# Patient Record
Sex: FEMALE | Race: ASIAN | NOT HISPANIC OR LATINO | Employment: FULL TIME | URBAN - METROPOLITAN AREA
[De-identification: names, ages, dates, MRNs, and addresses within clinical notes are randomized per-mention and may not be internally consistent; named-entity substitution may affect disease eponyms.]

---

## 2017-10-27 ENCOUNTER — TRANSCRIBE ORDERS (OUTPATIENT)
Dept: LAB | Facility: CLINIC | Age: 27
End: 2017-10-27

## 2017-10-27 ENCOUNTER — APPOINTMENT (OUTPATIENT)
Dept: LAB | Facility: CLINIC | Age: 27
End: 2017-10-27
Payer: COMMERCIAL

## 2017-10-27 DIAGNOSIS — J30.5: Primary | ICD-10-CM

## 2017-10-27 PROCEDURE — 36415 COLL VENOUS BLD VENIPUNCTURE: CPT

## 2017-10-27 PROCEDURE — 86003 ALLG SPEC IGE CRUDE XTRC EA: CPT

## 2017-10-27 PROCEDURE — 82785 ASSAY OF IGE: CPT

## 2017-10-30 LAB
A ALTERNATA IGE QN: <0.1 KUA/I
A FUMIGATUS IGE QN: <0.1 KUA/I
ALLERGEN COMMENT: ABNORMAL
ALLERGEN COMMENT: ABNORMAL
ALMOND IGE QN: <0.1 KUA/I
BERMUDA GRASS IGE QN: <0.1 KUA/I
BOXELDER IGE QN: <0.1 KUA/I
C HERBARUM IGE QN: <0.1 KUA/I
CASHEW NUT IGE QN: <0.1 KUA/I
CAT DANDER IGE QN: <0.1 KUA/I
CMN PIGWEED IGE QN: <0.1 KUA/I
CODFISH IGE QN: <0.1 KUA/I
COMMON RAGWEED IGE QN: <0.1 KUA/I
COTTONWOOD IGE QN: <0.1 KUA/I
D FARINAE IGE QN: <0.1 KUA/I
D PTERONYSS IGE QN: <0.1 KUA/I
DOG DANDER IGE QN: <0.1 KUA/I
EGG WHITE IGE QN: <0.1 KUA/I
GLUTEN IGE QN: 0.21 KUA/I
HAZELNUT IGE QN: <0.1 KUA/L
LONDON PLANE IGE QN: <0.1 KUA/I
MILK IGE QN: <0.1 KUA/I
MOUSE URINE PROT IGE QN: <0.1 KUA/I
MT JUNIPER IGE QN: <0.1 KUA/I
MUGWORT IGE QN: <0.1 KUA/I
P NOTATUM IGE QN: <0.1 KUA/I
PEANUT IGE QN: <0.1 KUA/I
ROACH IGE QN: 0.1 KUA/I
SALMON IGE QN: <0.1 KUA/I
SCALLOP IGE QN: <0.1 KUA/L
SESAME SEED IGE QN: <0.1 KUA/I
SHEEP SORREL IGE QN: <0.1 KUA/I
SHRIMP IGE QN: <0.1 KUA/L
SILVER BIRCH IGE QN: <0.1 KUA/I
SOYBEAN IGE QN: <0.1 KUA/I
TIMOTHY IGE QN: <0.1 KUA/I
TOTAL IGE SMQN RAST: 435 KU/L (ref 0–113)
TOTAL IGE SMQN RAST: 441 KU/L (ref 0–113)
TUNA IGE QN: <0.1 KUA/I
WALNUT IGE QN: <0.1 KUA/I
WALNUT IGE QN: <0.1 KUA/I
WHEAT IGE QN: <0.1 KUA/I
WHITE ASH IGE QN: <0.1 KUA/I
WHITE ELM IGE QN: <0.1 KUA/I
WHITE MULBERRY IGE QN: <0.1 KUA/I
WHITE OAK IGE QN: <0.1 KUA/I

## 2017-11-08 ENCOUNTER — TRANSCRIBE ORDERS (OUTPATIENT)
Dept: ADMINISTRATIVE | Facility: HOSPITAL | Age: 27
End: 2017-11-08

## 2017-11-08 DIAGNOSIS — R92.8 MAMMOGRAM ABNORMAL: Primary | ICD-10-CM

## 2017-11-15 ENCOUNTER — HOSPITAL ENCOUNTER (OUTPATIENT)
Dept: ULTRASOUND IMAGING | Facility: CLINIC | Age: 27
Discharge: HOME | End: 2017-11-15
Payer: COMMERCIAL

## 2017-11-15 DIAGNOSIS — R92.8 MAMMOGRAM ABNORMAL: ICD-10-CM

## 2017-11-15 DIAGNOSIS — N63.0 BREAST LUMP: ICD-10-CM

## 2017-11-15 PROCEDURE — 76642 ULTRASOUND BREAST LIMITED: CPT

## 2017-11-22 ENCOUNTER — ALLSCRIPTS OFFICE VISIT (OUTPATIENT)
Dept: OTHER | Facility: OTHER | Age: 27
End: 2017-11-22

## 2018-01-12 NOTE — MISCELLANEOUS
Provider Comments  Provider Comments:   PATIENT NO SHOWED TODAYS APPT      Signatures   Electronically signed by : Wiley Galvez, ; Nov 22 2017  3:00PM EST                       (Author)

## 2019-09-20 PROBLEM — J34.2 NASAL SEPTAL DEVIATION: Status: ACTIVE | Noted: 2019-09-20

## 2019-09-20 PROBLEM — J34.3 NASAL TURBINATE HYPERTROPHY: Status: ACTIVE | Noted: 2019-09-20

## 2019-09-20 PROBLEM — J34.89 NASAL OBSTRUCTION: Status: ACTIVE | Noted: 2019-09-20

## 2019-09-26 ENCOUNTER — APPOINTMENT (OUTPATIENT)
Dept: LAB | Facility: CLINIC | Age: 29
End: 2019-09-26
Payer: COMMERCIAL

## 2019-09-26 DIAGNOSIS — J34.89 NASAL OBSTRUCTION: ICD-10-CM

## 2019-09-26 DIAGNOSIS — J34.3 NASAL TURBINATE HYPERTROPHY: ICD-10-CM

## 2019-09-26 DIAGNOSIS — J34.2 NASAL SEPTAL DEVIATION: ICD-10-CM

## 2019-09-26 LAB
ANION GAP SERPL CALCULATED.3IONS-SCNC: 8 MMOL/L (ref 4–13)
BASOPHILS # BLD AUTO: 0.04 THOUSANDS/ΜL (ref 0–0.1)
BASOPHILS NFR BLD AUTO: 1 % (ref 0–1)
BUN SERPL-MCNC: 11 MG/DL (ref 5–25)
CALCIUM SERPL-MCNC: 9.1 MG/DL (ref 8.3–10.1)
CHLORIDE SERPL-SCNC: 105 MMOL/L (ref 100–108)
CO2 SERPL-SCNC: 29 MMOL/L (ref 21–32)
CREAT SERPL-MCNC: 0.72 MG/DL (ref 0.6–1.3)
EOSINOPHIL # BLD AUTO: 0.1 THOUSAND/ΜL (ref 0–0.61)
EOSINOPHIL NFR BLD AUTO: 2 % (ref 0–6)
ERYTHROCYTE [DISTWIDTH] IN BLOOD BY AUTOMATED COUNT: 13.9 % (ref 11.6–15.1)
GFR SERPL CREATININE-BSD FRML MDRD: 114 ML/MIN/1.73SQ M
GLUCOSE SERPL-MCNC: 74 MG/DL (ref 65–140)
HCT VFR BLD AUTO: 40.6 % (ref 34.8–46.1)
HGB BLD-MCNC: 13.1 G/DL (ref 11.5–15.4)
IMM GRANULOCYTES # BLD AUTO: 0 THOUSAND/UL (ref 0–0.2)
IMM GRANULOCYTES NFR BLD AUTO: 0 % (ref 0–2)
LYMPHOCYTES # BLD AUTO: 1.56 THOUSANDS/ΜL (ref 0.6–4.47)
LYMPHOCYTES NFR BLD AUTO: 38 % (ref 14–44)
MCH RBC QN AUTO: 30 PG (ref 26.8–34.3)
MCHC RBC AUTO-ENTMCNC: 32.3 G/DL (ref 31.4–37.4)
MCV RBC AUTO: 93 FL (ref 82–98)
MONOCYTES # BLD AUTO: 0.4 THOUSAND/ΜL (ref 0.17–1.22)
MONOCYTES NFR BLD AUTO: 10 % (ref 4–12)
NEUTROPHILS # BLD AUTO: 2.04 THOUSANDS/ΜL (ref 1.85–7.62)
NEUTS SEG NFR BLD AUTO: 49 % (ref 43–75)
NRBC BLD AUTO-RTO: 0 /100 WBCS
PLATELET # BLD AUTO: 245 THOUSANDS/UL (ref 149–390)
PMV BLD AUTO: 10.8 FL (ref 8.9–12.7)
POTASSIUM SERPL-SCNC: 3.8 MMOL/L (ref 3.5–5.3)
RBC # BLD AUTO: 4.37 MILLION/UL (ref 3.81–5.12)
SODIUM SERPL-SCNC: 142 MMOL/L (ref 136–145)
WBC # BLD AUTO: 4.14 THOUSAND/UL (ref 4.31–10.16)

## 2019-09-26 PROCEDURE — 36415 COLL VENOUS BLD VENIPUNCTURE: CPT

## 2019-09-26 PROCEDURE — 80048 BASIC METABOLIC PNL TOTAL CA: CPT

## 2019-09-26 PROCEDURE — 85025 COMPLETE CBC W/AUTO DIFF WBC: CPT

## 2019-09-26 NOTE — PRE-PROCEDURE INSTRUCTIONS
No outpatient medications have been marked as taking for the 10/3/19 encounter [facility] Encounter)  Pre op and bathing instructions reviewed

## 2019-09-27 PROBLEM — M95.0 ACQUIRED NASAL DEFORMITY: Status: ACTIVE | Noted: 2019-09-27

## 2019-09-27 PROBLEM — T17.1XXA FOREIGN BODY IN NOSE: Status: ACTIVE | Noted: 2019-09-27

## 2019-09-27 NOTE — H&P (VIEW-ONLY)
Debby Munson is a 30 y  o female who presents for re-evaluation of nasal obstruction  She had a previous rhinoplasty with an implant and is concerned that the implant may be getting infected as well  Her mother had a similar problem  She has considered whether she wants a cadaveric verses an autologous graft  At this time she is feeling that she would like to have a cadaveric graft due to pain concerns  Past Medical History:   Diagnosis Date    Gluten intolerance     PONV (postoperative nausea and vomiting)        There were no vitals taken for this visit  Physical Exam   Constitutional: Oriented to person, place, and time  Well-developed and well-nourished, no apparent distress, non-toxic appearance  Cooperative, able to hear and answer questions without difficulty  Voice: Normal voice quality  Head: Normocephalic, atraumatic  No scars, masses or lesions  Face: Symmetric, no edema, no sinus tenderness  Eyes: Vision grossly intact, extra-ocular movement intact  Ears: External ear normal   Bilateral tympanic membranes are intact with intact normal landmarks  No post-auricular erythema or tenderness  Nose:    Mucosa: Edematous   Turbinates: Hypertrophic bilaterally   Septum: Left septal deviation   Internal valves:  bilateral  nasal valve obstruction +[place] maneuver    External valves:   Zone 1  Zone 2      Right:  0  0      Left:  0  0   Nasal tip: Good tip support without droop  There is obvious graft material in the nasal tip which is mildly asymmetric and the implant is palpable to the tip of the nose  External contour: Mild C shaped deformity at the junction of the lower and middle third  Palpable implant across the entire dorsum with obvious extension  Nasal bones: No palpable osteotomies  Oral cavity: Dentition intact  Mucosa moist, lips normal   Tongue mobile, floor of mouth normal   Hard palate unremarkable  No masses or lesions     Oropharynx: Uvula is midline, soft palate normal   Unremarkable oropharyngeal inlet  Tonsils unremarkable  Posterior pharyngeal wall clear  No masses or lesions  Salivary glands:  Parotid glands and submandibular glands symmetric, no enlargement or tenderness  Neck: Normal laryngeal elevation with swallow  Trachea midline  No masses or lesions  No palpable adenopathy  Thyroid: normal in size, unremarkable without tenderness or palpable nodules  Pulmonary/Chest: Normal effort and rate  No respiratory distress  Clear to ausculation bilaterally  Musculoskeletal: Normal range of motion  Neurological: Cranial nerves 2-12 intact  Skin: Skin is warm and dry  Psychiatric: Normal mood and affect  CV: RRR    No palpable rib fractures or cartilage abnormalities  Pt brought my attention to an asymmetry of her breast implants and I will refer her to a breast surgeon  A/P: Nasal obstruction: Risks and benefits were again reviewed with the patient, including but not limited to bleeding, infection, external scars, internal scars, breathing obstruction, septal perforation, external deformity, numbness or stiffness of the nasal tip, asymmetry, need for revision, numbness of the upper teeth, among others  Questions were answered  History and physical and consent were obtained today in clinic  Plan is for:    Repair of nasal vestibular stenosis, removal of nasal foreign body, cadaveric rib cartilage graft, L strut septoplasty, submucous resection of turbinates, and reconstructive rhinoplasty after removal of the graft  Will plan for diced cartilage graft to improve dorsal contour and symmetry  Possible columellar extension graft to preserve length and dorsal graft to preserve form

## 2019-10-02 ENCOUNTER — ANESTHESIA EVENT (OUTPATIENT)
Dept: PERIOP | Facility: HOSPITAL | Age: 29
End: 2019-10-02
Payer: COMMERCIAL

## 2019-10-03 ENCOUNTER — ANESTHESIA (OUTPATIENT)
Dept: PERIOP | Facility: HOSPITAL | Age: 29
End: 2019-10-03
Payer: COMMERCIAL

## 2019-10-03 ENCOUNTER — HOSPITAL ENCOUNTER (OUTPATIENT)
Facility: HOSPITAL | Age: 29
Setting detail: OUTPATIENT SURGERY
Discharge: HOME | End: 2019-10-03
Attending: OTOLARYNGOLOGY | Admitting: OTOLARYNGOLOGY
Payer: COMMERCIAL

## 2019-10-03 VITALS
HEART RATE: 61 BPM | OXYGEN SATURATION: 97 % | SYSTOLIC BLOOD PRESSURE: 119 MMHG | TEMPERATURE: 98.3 F | RESPIRATION RATE: 19 BRPM | BODY MASS INDEX: 18.61 KG/M2 | DIASTOLIC BLOOD PRESSURE: 74 MMHG | HEIGHT: 63 IN | WEIGHT: 105 LBS

## 2019-10-03 DIAGNOSIS — J34.2 NASAL SEPTAL DEVIATION: ICD-10-CM

## 2019-10-03 DIAGNOSIS — T17.1XXS FOREIGN BODY IN NOSE, SEQUELA: ICD-10-CM

## 2019-10-03 DIAGNOSIS — M95.0 ACQUIRED NASAL DEFORMITY: ICD-10-CM

## 2019-10-03 DIAGNOSIS — J34.89 NASAL OBSTRUCTION: Primary | ICD-10-CM

## 2019-10-03 DIAGNOSIS — J34.3 NASAL TURBINATE HYPERTROPHY: ICD-10-CM

## 2019-10-03 LAB
EXT PREGNANCY TEST URINE: NEGATIVE
EXT. CONTROL: NORMAL

## 2019-10-03 PROCEDURE — 88304 TISSUE EXAM BY PATHOLOGIST: CPT | Performed by: PATHOLOGY

## 2019-10-03 PROCEDURE — 30140 RESECT INFERIOR TURBINATE: CPT | Performed by: OTOLARYNGOLOGY

## 2019-10-03 PROCEDURE — 30465 REPAIR NASAL STENOSIS: CPT | Performed by: OTOLARYNGOLOGY

## 2019-10-03 PROCEDURE — C1762 CONN TISS, HUMAN(INC FASCIA): HCPCS | Performed by: OTOLARYNGOLOGY

## 2019-10-03 PROCEDURE — 20926 PR REMV TISSUE FOR GRAFT OTHR: CPT | Performed by: OTOLARYNGOLOGY

## 2019-10-03 PROCEDURE — 30310 REMOVE NASAL FOREIGN BODY: CPT | Performed by: OTOLARYNGOLOGY

## 2019-10-03 PROCEDURE — 81025 URINE PREGNANCY TEST: CPT | Performed by: STUDENT IN AN ORGANIZED HEALTH CARE EDUCATION/TRAINING PROGRAM

## 2019-10-03 DEVICE — GRAFT COSTAL CARTILAGE MED 30MM X 3CM  10-30MM THCK: Type: IMPLANTABLE DEVICE | Site: NOSE | Status: FUNCTIONAL

## 2019-10-03 RX ORDER — METOCLOPRAMIDE HYDROCHLORIDE 5 MG/ML
10 INJECTION INTRAMUSCULAR; INTRAVENOUS ONCE AS NEEDED
Status: DISCONTINUED | OUTPATIENT
Start: 2019-10-03 | End: 2019-10-03 | Stop reason: HOSPADM

## 2019-10-03 RX ORDER — LIDOCAINE HYDROCHLORIDE AND EPINEPHRINE 10; 10 MG/ML; UG/ML
INJECTION, SOLUTION INFILTRATION; PERINEURAL AS NEEDED
Status: DISCONTINUED | OUTPATIENT
Start: 2019-10-03 | End: 2019-10-03 | Stop reason: HOSPADM

## 2019-10-03 RX ORDER — HYDROMORPHONE HCL/PF 1 MG/ML
SYRINGE (ML) INJECTION AS NEEDED
Status: DISCONTINUED | OUTPATIENT
Start: 2019-10-03 | End: 2019-10-03 | Stop reason: SURG

## 2019-10-03 RX ORDER — ACETAMINOPHEN 325 MG/1
650 TABLET ORAL EVERY 6 HOURS PRN
Status: DISCONTINUED | OUTPATIENT
Start: 2019-10-03 | End: 2019-10-03 | Stop reason: HOSPADM

## 2019-10-03 RX ORDER — GLYCOPYRROLATE 0.2 MG/ML
INJECTION INTRAMUSCULAR; INTRAVENOUS AS NEEDED
Status: DISCONTINUED | OUTPATIENT
Start: 2019-10-03 | End: 2019-10-03 | Stop reason: SURG

## 2019-10-03 RX ORDER — FENTANYL CITRATE/PF 50 MCG/ML
50 SYRINGE (ML) INJECTION
Status: DISCONTINUED | OUTPATIENT
Start: 2019-10-03 | End: 2019-10-03 | Stop reason: HOSPADM

## 2019-10-03 RX ORDER — ROCURONIUM BROMIDE 10 MG/ML
INJECTION, SOLUTION INTRAVENOUS AS NEEDED
Status: DISCONTINUED | OUTPATIENT
Start: 2019-10-03 | End: 2019-10-03 | Stop reason: SURG

## 2019-10-03 RX ORDER — SCOLOPAMINE TRANSDERMAL SYSTEM 1 MG/1
1 PATCH, EXTENDED RELEASE TRANSDERMAL
Status: DISCONTINUED | OUTPATIENT
Start: 2019-10-03 | End: 2019-10-03 | Stop reason: HOSPADM

## 2019-10-03 RX ORDER — LIDOCAINE HYDROCHLORIDE 10 MG/ML
INJECTION, SOLUTION EPIDURAL; INFILTRATION; INTRACAUDAL; PERINEURAL AS NEEDED
Status: DISCONTINUED | OUTPATIENT
Start: 2019-10-03 | End: 2019-10-03 | Stop reason: SURG

## 2019-10-03 RX ORDER — FENTANYL CITRATE 50 UG/ML
INJECTION, SOLUTION INTRAMUSCULAR; INTRAVENOUS AS NEEDED
Status: DISCONTINUED | OUTPATIENT
Start: 2019-10-03 | End: 2019-10-03 | Stop reason: SURG

## 2019-10-03 RX ORDER — LIDOCAINE HYDROCHLORIDE 10 MG/ML
0.5 INJECTION, SOLUTION EPIDURAL; INFILTRATION; INTRACAUDAL; PERINEURAL ONCE AS NEEDED
Status: DISCONTINUED | OUTPATIENT
Start: 2019-10-03 | End: 2019-10-03 | Stop reason: HOSPADM

## 2019-10-03 RX ORDER — OXYMETAZOLINE HYDROCHLORIDE 0.05 G/100ML
SPRAY NASAL AS NEEDED
Status: DISCONTINUED | OUTPATIENT
Start: 2019-10-03 | End: 2019-10-03 | Stop reason: HOSPADM

## 2019-10-03 RX ORDER — SODIUM CHLORIDE, SODIUM LACTATE, POTASSIUM CHLORIDE, CALCIUM CHLORIDE 600; 310; 30; 20 MG/100ML; MG/100ML; MG/100ML; MG/100ML
INJECTION, SOLUTION INTRAVENOUS CONTINUOUS PRN
Status: DISCONTINUED | OUTPATIENT
Start: 2019-10-03 | End: 2019-10-03 | Stop reason: SURG

## 2019-10-03 RX ORDER — ONDANSETRON 2 MG/ML
INJECTION INTRAMUSCULAR; INTRAVENOUS AS NEEDED
Status: DISCONTINUED | OUTPATIENT
Start: 2019-10-03 | End: 2019-10-03 | Stop reason: SURG

## 2019-10-03 RX ORDER — DIPHENHYDRAMINE HYDROCHLORIDE 50 MG/ML
12.5 INJECTION INTRAMUSCULAR; INTRAVENOUS ONCE AS NEEDED
Status: DISCONTINUED | OUTPATIENT
Start: 2019-10-03 | End: 2019-10-03 | Stop reason: HOSPADM

## 2019-10-03 RX ORDER — ONDANSETRON 4 MG/1
8 TABLET, ORALLY DISINTEGRATING ORAL ONCE
Status: COMPLETED | OUTPATIENT
Start: 2019-10-03 | End: 2019-10-03

## 2019-10-03 RX ORDER — OXYCODONE HYDROCHLORIDE 5 MG/1
5 TABLET ORAL EVERY 4 HOURS PRN
Qty: 15 TABLET | Refills: 0 | Status: SHIPPED | OUTPATIENT
Start: 2019-10-03 | End: 2019-10-13

## 2019-10-03 RX ORDER — GINSENG 100 MG
CAPSULE ORAL AS NEEDED
Status: DISCONTINUED | OUTPATIENT
Start: 2019-10-03 | End: 2019-10-03 | Stop reason: HOSPADM

## 2019-10-03 RX ORDER — HYDROMORPHONE HCL/PF 1 MG/ML
0.2 SYRINGE (ML) INJECTION
Status: DISCONTINUED | OUTPATIENT
Start: 2019-10-03 | End: 2019-10-03 | Stop reason: HOSPADM

## 2019-10-03 RX ORDER — NEOSTIGMINE METHYLSULFATE 1 MG/ML
INJECTION INTRAVENOUS AS NEEDED
Status: DISCONTINUED | OUTPATIENT
Start: 2019-10-03 | End: 2019-10-03 | Stop reason: SURG

## 2019-10-03 RX ORDER — OXYCODONE HCL 5 MG/5 ML
5 SOLUTION, ORAL ORAL EVERY 4 HOURS PRN
Status: DISCONTINUED | OUTPATIENT
Start: 2019-10-03 | End: 2019-10-03 | Stop reason: HOSPADM

## 2019-10-03 RX ORDER — DEXAMETHASONE SODIUM PHOSPHATE 10 MG/ML
INJECTION, SOLUTION INTRAMUSCULAR; INTRAVENOUS AS NEEDED
Status: DISCONTINUED | OUTPATIENT
Start: 2019-10-03 | End: 2019-10-03 | Stop reason: SURG

## 2019-10-03 RX ORDER — MAGNESIUM HYDROXIDE 1200 MG/15ML
LIQUID ORAL AS NEEDED
Status: DISCONTINUED | OUTPATIENT
Start: 2019-10-03 | End: 2019-10-03 | Stop reason: HOSPADM

## 2019-10-03 RX ORDER — HYDROMORPHONE HCL/PF 1 MG/ML
0.5 SYRINGE (ML) INJECTION
Status: DISCONTINUED | OUTPATIENT
Start: 2019-10-03 | End: 2019-10-03 | Stop reason: HOSPADM

## 2019-10-03 RX ORDER — ONDANSETRON 2 MG/ML
4 INJECTION INTRAMUSCULAR; INTRAVENOUS ONCE AS NEEDED
Status: DISCONTINUED | OUTPATIENT
Start: 2019-10-03 | End: 2019-10-03 | Stop reason: HOSPADM

## 2019-10-03 RX ORDER — MIDAZOLAM HYDROCHLORIDE 1 MG/ML
INJECTION INTRAMUSCULAR; INTRAVENOUS AS NEEDED
Status: DISCONTINUED | OUTPATIENT
Start: 2019-10-03 | End: 2019-10-03 | Stop reason: SURG

## 2019-10-03 RX ORDER — PROPOFOL 10 MG/ML
INJECTION, EMULSION INTRAVENOUS AS NEEDED
Status: DISCONTINUED | OUTPATIENT
Start: 2019-10-03 | End: 2019-10-03 | Stop reason: SURG

## 2019-10-03 RX ORDER — CEPHALEXIN 500 MG/1
500 CAPSULE ORAL 4 TIMES DAILY
Qty: 28 CAPSULE | Refills: 0 | Status: SHIPPED | OUTPATIENT
Start: 2019-10-03 | End: 2019-10-10

## 2019-10-03 RX ORDER — CEFAZOLIN SODIUM 1 G/50ML
SOLUTION INTRAVENOUS AS NEEDED
Status: DISCONTINUED | OUTPATIENT
Start: 2019-10-03 | End: 2019-10-03 | Stop reason: SURG

## 2019-10-03 RX ADMIN — MIDAZOLAM HYDROCHLORIDE 2 MG: 1 INJECTION, SOLUTION INTRAMUSCULAR; INTRAVENOUS at 13:06

## 2019-10-03 RX ADMIN — HYDROMORPHONE HYDROCHLORIDE 0.25 MG: 1 INJECTION, SOLUTION INTRAMUSCULAR; INTRAVENOUS; SUBCUTANEOUS at 15:30

## 2019-10-03 RX ADMIN — SODIUM CHLORIDE, SODIUM LACTATE, POTASSIUM CHLORIDE, AND CALCIUM CHLORIDE: .6; .31; .03; .02 INJECTION, SOLUTION INTRAVENOUS at 16:15

## 2019-10-03 RX ADMIN — LIDOCAINE HYDROCHLORIDE 50 MG: 10 INJECTION, SOLUTION EPIDURAL; INFILTRATION; INTRACAUDAL; PERINEURAL at 13:13

## 2019-10-03 RX ADMIN — HYDROMORPHONE HYDROCHLORIDE 0.25 MG: 1 INJECTION, SOLUTION INTRAMUSCULAR; INTRAVENOUS; SUBCUTANEOUS at 16:16

## 2019-10-03 RX ADMIN — FENTANYL CITRATE 50 MCG: 50 INJECTION INTRAMUSCULAR; INTRAVENOUS at 17:03

## 2019-10-03 RX ADMIN — NEOSTIGMINE METHYLSULFATE 2.5 MG: 1 INJECTION INTRAVENOUS at 16:12

## 2019-10-03 RX ADMIN — FENTANYL CITRATE 50 MCG: 50 INJECTION INTRAMUSCULAR; INTRAVENOUS at 16:57

## 2019-10-03 RX ADMIN — SCOPOLAMINE 1 PATCH: 1 PATCH TRANSDERMAL at 12:55

## 2019-10-03 RX ADMIN — CEFAZOLIN SODIUM 1000 MG: 1 SOLUTION INTRAVENOUS at 13:17

## 2019-10-03 RX ADMIN — ROCURONIUM BROMIDE 50 MG: 10 SOLUTION INTRAVENOUS at 13:13

## 2019-10-03 RX ADMIN — SODIUM CHLORIDE, SODIUM LACTATE, POTASSIUM CHLORIDE, AND CALCIUM CHLORIDE: .6; .31; .03; .02 INJECTION, SOLUTION INTRAVENOUS at 13:09

## 2019-10-03 RX ADMIN — HYDROMORPHONE HYDROCHLORIDE 0.25 MG: 1 INJECTION, SOLUTION INTRAMUSCULAR; INTRAVENOUS; SUBCUTANEOUS at 14:15

## 2019-10-03 RX ADMIN — OXYCODONE HYDROCHLORIDE 5 MG: 5 SOLUTION ORAL at 18:21

## 2019-10-03 RX ADMIN — ONDANSETRON 8 MG: 4 TABLET, ORALLY DISINTEGRATING ORAL at 18:57

## 2019-10-03 RX ADMIN — FENTANYL CITRATE 100 MCG: 50 INJECTION INTRAMUSCULAR; INTRAVENOUS at 13:13

## 2019-10-03 RX ADMIN — PROPOFOL 150 MG: 10 INJECTION, EMULSION INTRAVENOUS at 13:13

## 2019-10-03 RX ADMIN — GLYCOPYRROLATE 0.5 MG: 0.2 INJECTION, SOLUTION INTRAMUSCULAR; INTRAVENOUS at 16:12

## 2019-10-03 RX ADMIN — DEXAMETHASONE SODIUM PHOSPHATE 10 MG: 10 INJECTION, SOLUTION INTRAMUSCULAR; INTRAVENOUS at 13:16

## 2019-10-03 RX ADMIN — ONDANSETRON 4 MG: 2 INJECTION INTRAMUSCULAR; INTRAVENOUS at 16:06

## 2019-10-03 RX ADMIN — HYDROMORPHONE HYDROCHLORIDE 0.25 MG: 1 INJECTION, SOLUTION INTRAMUSCULAR; INTRAVENOUS; SUBCUTANEOUS at 14:26

## 2019-10-03 RX ADMIN — PROPOFOL 20 MG: 10 INJECTION, EMULSION INTRAVENOUS at 16:15

## 2019-10-03 NOTE — ANESTHESIA PREPROCEDURE EVALUATION
Review of Systems/Medical History  Patient summary reviewed  Chart reviewed  History of anesthetic complications PONV    Cardiovascular  Negative cardio ROS    Pulmonary  Negative pulmonary ROS        GI/Hepatic  Negative GI/hepatic ROS          Negative  ROS        Endo/Other    Comment: Nasal obstruction  Septal deviation  Turbinate hypertrophy    GYN  Negative gynecology ROS          Hematology  Negative hematology ROS      Musculoskeletal  Negative musculoskeletal ROS        Neurology  Negative neurology ROS      Psychology   Negative psychology ROS              Physical Exam    Airway    Mallampati score: II  TM Distance: >3 FB  Neck ROM: full     Dental   No notable dental hx     Cardiovascular  Comment: Negative ROS, Rhythm: regular, Rate: normal, Cardiovascular exam normal    Pulmonary  Pulmonary exam normal Breath sounds clear to auscultation,     Other Findings        Anesthesia Plan  ASA Score- 1     Anesthesia Type- general with ASA Monitors  Additional Monitors:   Airway Plan: ETT  Plan Factors-    Induction- intravenous  Postoperative Plan- Plan for postoperative opioid use  Informed Consent- Anesthetic plan and risks discussed with patient  I personally reviewed this patient with the CRNA  Discussed and agreed on the Anesthesia Plan with the CRNA  Marina Cardenas Recent labs personally reviewed:  Lab Results   Component Value Date    WBC 4 14 (L) 09/26/2019    HGB 13 1 09/26/2019     09/26/2019     Lab Results   Component Value Date    K 3 8 09/26/2019    BUN 11 09/26/2019    CREATININE 0 72 09/26/2019     I, Eleanor Roper MD, have personally seen and evaluated the patient prior to anesthetic care  I have reviewed the pre-anesthetic record, and other medical records if appropriate to the anesthetic care  If a CRNA is involved in the case, I have reviewed the CRNA assessment, if present, and agree   Risks/benefits and alternatives discussed with patient including possible PONV, sore throat, and possibility of rare anesthetic and surgical emergencies

## 2019-10-03 NOTE — OP NOTE
OPERATIVE REPORT  PATIENT NAME: Tomasa Banks    :  1990  MRN: [id number]  Pt Location: AN OR ROOM 03    SURGERY DATE: 10/3/2019    Surgeon(s) and Role:     * Aubrey Pimentel MD - Primary    Preop Diagnosis:  Nasal obstruction [J34 89]  Nasal septal deviation [J34 2]  Nasal turbinate hypertrophy [J34 3]    Post-Op Diagnosis Codes:     * Nasal obstruction [J34 89]     * Nasal septal deviation [J34 2]     * Nasal turbinate hypertrophy [J34 3]    Procedure(s) (LRB):  REPAIR VESTIBULAR STENOSIS (N/A)  SUBMUCOSAL RESECTION OF TURBINATES (N/A)  RHINOPLASTY (N/A)  RIB CARTILAGE GRAFT ( FOR RHINOPLASTY) (N/A)    Specimen(s):  ID Type Source Tests Collected by Time Destination   1 : DORSAL NASAL TISSUE Tissue Soft Tissue, Other TISSUE EXAM Aubrey Pimentel MD 10/3/2019 1415          Drains:  * No LDAs found *    Anesthesia Type:   General    Operative Indications:  Nasal obstruction [J34 89]  Nasal septal deviation [J34 2]  Nasal turbinate hypertrophy [J34 3]  Foreign body of the nose sequelae    Operative Findings:  Large dorsal silicone implant with obvious necrotic tissue surrounding  Previous septoplasty performed  Complications:   None    Procedure and Technique:  PREOPERATIVE DIAGNOSES:  1  Nasal valve stenosis with obstruction  2  Septal deviation with nasal obstruction  3  Turbinate hypertrophy  4  Nasal deformity    POSTOPERATIVE DIAGNOSES:  Same    PROCEDURES:  1  Open repair of nasal stenosis  2  Removal of nasal foreign body  3  Rib cartilage graft to nose  4  Bilateral submucous resection of inferior turbinates  5  Rhinoplasty (cosmetic defect caused by the necrotic defect from the previously implanted foreign body)    SURGEON:  Xenia Russ MD    ASSISTANTS: None    ANESTHESIA:  General     ESTIMATED BLOOD LOSS:  < 100 cc    FLUIDS:  Crystalloid    COMPLICATIONS:  None      INDICATIONS FOR PROCEDURE:  This is a 30y o  year old female whom I've seen previously in consultation regarding nasal obstruction and desire to change shape of the nose  Risks and benefits of the above procedures were discussed at length, including but not limited to bleeding, infection, external or internal nasal scars, septal perforation, change in the shape of the nose, undesired aesthetic outcome, nasal obstruction, among others  Patient understands and consents to proceed  BODY OF REPORT:   The patient was brought to the procedure room, placed on the procedure table in supine position  General  anesthesia was induced without complication  The table was rotated  The nose was injected with a mixture of 1% lidocaine with 1:100,000 of epinephrine in the tip, columella, dorsum, alar bases bilaterally, and septum bilaterally  An afrin-soaked pledget was placed in each nostril  The face was prepped and draped in the usual sterile fashion  The eyelids were gently taped sterilely after removing any prep from the lid skin  A standard timeout was taken to confirm patient name, procedures, sidedness, allergies, medications administered among others  Submucous resection of the inferior turbinates was performed bilaterally as follows: The anterior portion of the inferior turbinate was incised using electrocautery  A submucoperiosteal dissection was performed using a Coal elevator  The anterior 1 5-2 cm of bone was removed with a Olga Greer forcep  Mucosa was preserved  The turbinate was outfractured using a Toole elevator  Repair of nasal vestibular stenosis was performed as follows: An inverted gull-wing incision was made at the mid-columellar level using a 6700 blade, in continuity with incisions 1-2 mm below the inferior margin of the lower lateral cartilages intranasally  The nose was decorticated in a sub-SNAS plane using scissors, to the level of the rhinion  At this level, a McKenty elevator was used to dissect subperiosteally to the nasofrontal suture      Removal of nasal foreign body was performed as follows: After the nose was opened a cartilage shield graft was encountered  This was preserved as the intent was not to change the appearance of her nose  Shield graft was left in the exact orientation it was previously aligned in  Immediately posterior and dorsal to the shield graft was a silicone graft and significant tissue necrosis around the implant  The implant was removed using blunt dissection  The necrotic tissue was then removed sharply using Jose G Brian scissors  The area was copiously irrigated with normal saline  The upper lateral cartilages were released from the septum, using a D-knife  Auto  grafts were formed from the ULC by making a small incision along length of the ULC  The medial portion was then folded down between the upper lateral cartilage and the septum and sutured using 5-0 PDS  Care was taken to preserve the native superoinferior positioning of the upper lateral cartilages in relation to the septum  Cadaveric rib cartilage graft was performed as follows: Irradiated rib allograft was thawed and washed in saline per protocol (three washes)  The rib was then carved towards its core concentrically, and let sit in saline to identify any warping  The graft was then diced into very fine pieces using a dermatome blade  Rhinoplasty for defect left by removal of the destructive dorsal implant was performed as follows: The patient was left with a flat in nasal appearance from the previous silicone graft removal   This left a substantial decrease in her dorsal support and dramatic change from her baseline appearance  It was thus determined that the patient required dorsal augmentation to mimic the previous graft in the now healing pocket  After cleaning the wound copiously with saline the diced cartilage was placed into a 3 mL syringe that had previously been split in half  The graft was pressed down to remove any remaining saline   The graft was then covered in [address] and pressed into a mold from the syringe and held to dry for 5 minutes  This was then shaped similiarly to her previous dorsal implant using a 15 blade  She  was checked for contour and tip support  She  was found to have good contour and symmetry from all angles, including lateral, frontal and base view  Tip support was good  The skin flap was replaced and contour verified  The columella was closed using multiple interrupted 6-0 Prolene sutures  Intranasal incisions were carefully sutured using  5-0 chromic gut suture  An external thermoplastic splint was placed after taping the nose  The patient was returned to the care of Anesthesia, extubated without difficulty, and taken to the recovery area in stable condition  All instruments and sponge counts were correct at the end of the procedure  Paulette Shah MD, was present for and performed all key elements of the procedure       I was present for the entire procedure and A qualified resident physician was not available    Patient Disposition:  PACU  and extubated and stable    SIGNATURE: Caryle Pray, MD  DATE: October 3, 2019  TIME: 4:50 PM

## 2019-10-03 NOTE — ANESTHESIA POSTPROCEDURE EVALUATION
Post-Op Assessment Note    CV Status:  Stable    Pain management: adequate     Mental Status:  Alert and awake   Hydration Status:  Stable   PONV Controlled:  None   Airway Patency:  Patent   Post Op Vitals Reviewed: Yes      Staff: CRNA           /61 (10/03/19 1636)    Temp 97 5 °F (36 4 °C) (10/03/19 1636)    Pulse 77 (10/03/19 1636)   Resp 20 (10/03/19 1636)    SpO2 100 % (10/03/19 1636)

## 2019-10-03 NOTE — DISCHARGE INSTRUCTIONS
[facility]  [facility]   Rhinoplasty Post-Operative Care  Office [phone]  Cell [phone]               At Home (in the days immediately following the procedure):     Try to sleep with your head elevated on 2-3 pillows   You may experience moderate bleeding from the nose--this is normal  The gauze dressing under your nose may be changed as necessary   Iced packs placed over the eyes will help reduce swelling  They should be used 20 minutes on/ 20 minutes off while awake for the first full day  Crushed ice in ziplock bags or frozen peas or corn work well   The outside and half inch inside each nostril may be cleaned with a Q-tip soaked in hydrogen peroxide   Apply antibiotic ointment (Bacitracin) or Vaseline to the first half inch inside your nose and the external incision 3-4 times per day   Take your medicines as prescribed  REMEMBER:  DO NOT DRIVE WHILE TAKING PAIN MEDICATIONS   You may shower with luke-warm water only (avoid getting the cast wet)   Do not blow your nose for 7-10 days  If you need to sneeze, do so with an open mouth   You may use ibuprofen (Motrin, Advil) and acetaminophen (Tyelnol) for pain control in addition to any prescribed pain medications  You may get out of bed and go to the bathroom with assistance  Bleeding should decrease over the first 24 hours; you may have bleeding when changing positions quickly  You should keep the rigid dressing covering the bridge of your nose as dry as possible  Your eyes may even swell shut  Eat light, soft meals as tolerated, avoiding gas-stimulating foods  Follow-up care:   Eat before coming to the office for post-operative visits  Rest for the first week after the procedure, avoiding excessive physical activities, hard chewing, lifting objects over 8 lbs (about the weight of a phone book), or bending over      Be very gentle when brushing your upper teeth as they may be numb after surgery  We request that you do not travel by plane for one week after surgery  Lubricate your nose as directed with Q-tips and Vaseline to soften hardened crusts  You can expect some light blood-tinged drainage from your nose for several days  If your nose begins to bleed copiously, spray or instill Afrin (generic = Oxymetazoline HCL) nose drops into the nostril that is bleeding and wait  If the bleeding continues for 5 minutes or clots expel and/or you begin to swallow blood, please call our office or on call surgeon at the numbers below  If there is excessive pain, high fever (greater than 101  5oF), or if you injure your nose, please call our office or on call surgeon at the numbers below  Follow-up visits: At one week, the cast/splints will be removed; you may drive yourself to this appointment (as long as you are no longer taking prescription/narcotic pain medicines)  After cast removal, make-up may be worn, avoiding the incision lines  Additional follow-up visits will be scheduled at this time  Note that final results may not be apparent until [place] after surgery  Healing Care:   After the cast is removed, avoid striking or bumping your nose; try not to roll onto it while asleep  Clean the external nasal skin gently but thoroughly with soap  The use of alcohol and tobacco products prolong swelling and healing and are best avoided for 2 weeks after surgery  Do not expose your nose to sun for 4-6 weeks after surgery  Use sunscreen (SPF 30 or higher) for 6 months after surgery if sun exposure is absolutely necessary  Avoid any physical exercise that can cause over-heating or over-exertion for two weeks after the surgery  Your nose may be swollen and stuffy for several months  Complete healing may take 12 months  It is to your advantage to return for all postoperative visits so that long-term results may be evaluated        Frequently Asked Questions:  When can I shower and shampoo my hair? You may shower the day after your surgery, BUT KEEP THE NASAL CAST DRY  This may mean you wash your face/hair in the sink instead  It is important that you do not use hot water, as this can increase the swelling  Lukewarm water is best       When will the swelling and bruising go away? This usually takes 7-10 days or so, but may take less or more time, depending on the individual     When can I take aspirin? You should not take aspirin for 2 weeks prior to or after surgery  The same is true for vitamin E, ginko, garlic pills, and other natural supplements  When can I take ibuprofen? Non-steroidal anti-inflammatory drugs such as advil (ibuprofen), alleve (naproxen), or other similar may be used immediately after surgery as per the guidelines on the package  When can I wear my glasses? You will be able to wear contact lenses as soon as you feel comfortable  You may wear glasses one to two weeks after surgery, depending on the type of rhinoplasty you had  In any case, you must be careful not to place any pressure on the glasses (and thus your nose)  When can I wear makeup? Make-up can be applied after cast removal, but not directly on the incisions until 3 weeks after the procedure  When will I see my results? Final results in rhinoplasty can take 12 months  However, notable changes should be evident in the weeks immediately after the procedure  Typically, the 1-2 mm of residual swelling is what takes a variable amount of time to resolve, and can make subtle but significant differences in nasal shape  What about exercise? Please adhere to the following schedule:   Up to week 1 after surgery:  REST! No strenuous exercise  Walking is ok  Week 1-3 after surgery: You may begin light aerobic exercise, but no bending over/straining/lifting weights  Week 3+: You may begin more strenuous exercise, such as yoga, stretching, bending over, lifting weights    Please remember to start slowly  Week 6+: You may resume contact sports, such as soccer, basketball, etc    How to contact us:    Phone: If you have questions or concerns, please call us at [phone] during business hours (8 am to 5 pm)  On nights and weekends, you may page the ENT surgeon on call  at [facility]/[place]   In case of emergency, please call [phone]

## 2019-10-03 NOTE — INTERVAL H&P NOTE
H&P reviewed  After examining the patient I find no changes in the patients condition since the H&P had been written      Vitals:    10/03/19 1220   BP: 108/72   Pulse: 84   Resp: 20   Temp: 97 8 °F (36 6 °C)   SpO2: 100%

## (undated) DEVICE — GLOVE SRG BIOGEL 7.5

## (undated) DEVICE — SPLINT 1524050 5PK PAIR DOYLE II AIRWAY: Brand: DOYLE II ™

## (undated) DEVICE — 3M™ STERI-STRIP™ COMPOUND BENZOIN TINCTURE 40 BAGS/CARTON 4 CARTONS/CASE C1544: Brand: 3M™ STERI-STRIP™

## (undated) DEVICE — IV CATH 18 G X 1.16 IN

## (undated) DEVICE — INTENDED FOR TISSUE SEPARATION, AND OTHER PROCEDURES THAT REQUIRE A SHARP SURGICAL BLADE TO PUNCTURE OR CUT.: Brand: BARD-PARKER ® CARBON RIB-BACK BLADES

## (undated) DEVICE — PAD GROUNDING ADULT

## (undated) DEVICE — TISSEEL FIBRIN 4ML FROZEN

## (undated) DEVICE — SUT PLAIN 4-0 SC-1 18 IN 1828H

## (undated) DEVICE — SUT PROLENE 5-0 P-3 18 IN 8698G

## (undated) DEVICE — SPECIMEN CONTAINER STERILE PEEL PACK

## (undated) DEVICE — NEEDLE 27 G X 1 1/4

## (undated) DEVICE — 3M™ STERI-STRIP™ REINFORCED ADHESIVE SKIN CLOSURES, R1547, 1/2 IN X 4 IN (12 MM X 100 MM), 6 STRIPS/ENVELOPE: Brand: 3M™ STERI-STRIP™

## (undated) DEVICE — STRL COTTON TIP APPLCTR 6IN PK: Brand: CARDINAL HEALTH

## (undated) DEVICE — ELECTRODE BLADE MOD E-Z CLEAN  2.75IN 7CM -0012AM

## (undated) DEVICE — SUT ETHILON 4-0 PS-2 18 IN 1667H

## (undated) DEVICE — DERMATOME BLADES: Brand: DERMATOME

## (undated) DEVICE — STERILE BETHLEHEM NASAL PACK: Brand: CARDINAL HEALTH

## (undated) DEVICE — SKIN MARKER DUAL TIP WITH RULER CAP, FLEXIBLE RULER AND LABELS: Brand: DEVON

## (undated) DEVICE — MINI BLADE ROUND TIP SHARP ON ONE SIDE

## (undated) DEVICE — GLOVE INDICATOR PI UNDERGLOVE SZ 7.5 BLUE

## (undated) DEVICE — SYRINGE 10ML LL CONTROL TOP

## (undated) DEVICE — NEURO PATTIES 1/2 X 3

## (undated) DEVICE — POV-IOD SOLUTION 4OZ BT

## (undated) DEVICE — UNDYED MONOFILAMENT (POLYDIOXANONE), ABSORBABLE SYNTHETIC SURGICAL SUTURE: Brand: PDS

## (undated) DEVICE — ELECTRODE NEEDLE MEGAFINE 2IN E-Z CLEAN MEGADYNE -0118

## (undated) DEVICE — GAUZE SPONGES,16 PLY: Brand: CURITY

## (undated) DEVICE — SUT CHROMIC 5-0 P-3 18 IN 687G

## (undated) DEVICE — SUT PROLENE 6-0 P-3 18 IN 8695G

## (undated) DEVICE — BASIC SINGLE BASIN 2-LF: Brand: MEDLINE INDUSTRIES, INC.

## (undated) DEVICE — PENCIL ELECTROSURG E-Z CLEAN -0035H